# Patient Record
Sex: FEMALE
[De-identification: names, ages, dates, MRNs, and addresses within clinical notes are randomized per-mention and may not be internally consistent; named-entity substitution may affect disease eponyms.]

---

## 2019-10-08 ENCOUNTER — RX ONLY (OUTPATIENT)
Age: 59
Setting detail: RX ONLY
End: 2019-10-08

## 2019-10-08 ENCOUNTER — APPOINTMENT (RX ONLY)
Dept: URBAN - METROPOLITAN AREA CLINIC 87 | Facility: CLINIC | Age: 59
Setting detail: DERMATOLOGY
End: 2019-10-08

## 2019-10-08 DIAGNOSIS — L65.9 NONSCARRING HAIR LOSS, UNSPECIFIED: ICD-10-CM

## 2019-10-08 PROBLEM — J45.909 UNSPECIFIED ASTHMA, UNCOMPLICATED: Status: ACTIVE | Noted: 2019-10-08

## 2019-10-08 PROCEDURE — ? PRESCRIPTION

## 2019-10-08 PROCEDURE — 99202 OFFICE O/P NEW SF 15 MIN: CPT

## 2019-10-08 PROCEDURE — ? ORDER TESTS

## 2019-10-08 PROCEDURE — ? DIAGNOSIS COMMENT

## 2019-10-08 PROCEDURE — ? PATIENT SPECIFIC COUNSELING

## 2019-10-08 PROCEDURE — ? COUNSELING

## 2019-10-08 RX ORDER — BETAMETHASONE DIPROPIONATE 0.5 MG/ML
LOTION, AUGMENTED TOPICAL
Qty: 1 | Refills: 3 | Status: ERX | COMMUNITY
Start: 2019-10-08

## 2019-10-08 RX ORDER — BETAMETHASONE DIPROPIONATE 0.5 MG/ML
LOTION TOPICAL
Qty: 1 | Refills: 0 | Status: ERX | COMMUNITY
Start: 2019-10-08

## 2019-10-08 RX ADMIN — BETAMETHASONE DIPROPIONATE: 0.5 LOTION TOPICAL at 19:16

## 2019-10-08 ASSESSMENT — LOCATION ZONE DERM: LOCATION ZONE: SCALP

## 2019-10-08 ASSESSMENT — LOCATION SIMPLE DESCRIPTION DERM: LOCATION SIMPLE: POSTERIOR SCALP

## 2019-10-08 ASSESSMENT — LOCATION DETAILED DESCRIPTION DERM: LOCATION DETAILED: POSTERIOR MID-PARIETAL SCALP

## 2019-10-08 NOTE — PROCEDURE: DIAGNOSIS COMMENT
Comment: Ddx: telogen effluvium vs alopecia of chronic disease vs alopecia incognita
Detail Level: Zone

## 2019-10-08 NOTE — PROCEDURE: PATIENT SPECIFIC COUNSELING
Detail Level: Simple
I discussed with the pt possible reasons for hair loss. The most likely reason is telegen effluvium after her hospitalization. Since she has a h/o thyroid disease, I suggested that we do a blood screening for any medical conditions that may be contributing to the hair loss. For alopecia incognita, we will need a biopsy, but we can start treatments first and consider biopsy if there is no improvement. Pt understands she will need 3-4months of treatment before assessing its efficacy. \\nPt will start OTC rogaine solution. Topical betamethasone lotion to areas of pruritus.

## 2021-06-02 NOTE — HPI: HAIR LOSS
Previous Labs: No
How Did The Hair Loss Occur?: gradual in onset
How Severe Is Your Hair Loss?: moderate
Additional History: Pt reports that she was hospitalized in June 2019 with a respiratory illness, possibly pneumonia. She has a h/o asthma and hypothyroidism. She reports that all of her family members have hair, but her brother’s hairline is receding. Pt reports that she has not had a blood test recently. 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
Crackles to R base but with good air movement throughout.